# Patient Record
Sex: MALE | Race: WHITE | URBAN - METROPOLITAN AREA
[De-identification: names, ages, dates, MRNs, and addresses within clinical notes are randomized per-mention and may not be internally consistent; named-entity substitution may affect disease eponyms.]

---

## 2017-06-01 PROBLEM — G56.03 BILATERAL CARPAL TUNNEL SYNDROME: Status: ACTIVE | Noted: 2017-06-01

## 2017-06-01 PROBLEM — M25.50 ARTHRALGIA, UNSPECIFIED JOINT: Status: ACTIVE | Noted: 2017-06-01

## 2017-06-01 PROBLEM — I10 ESSENTIAL HYPERTENSION: Status: ACTIVE | Noted: 2017-06-01

## 2017-06-01 PROBLEM — I73.00 RAYNAUD'S DISEASE WITHOUT GANGRENE: Status: ACTIVE | Noted: 2017-06-01

## 2017-08-14 PROBLEM — M06.09 SERONEGATIVE RHEUMATOID ARTHRITIS OF MULTIPLE SITES (HCC): Status: ACTIVE | Noted: 2017-08-14

## 2022-05-04 ENCOUNTER — OFFICE VISIT (OUTPATIENT)
Dept: PHYSICAL MEDICINE AND REHAB | Facility: CLINIC | Age: 67
End: 2022-05-04
Payer: COMMERCIAL

## 2022-05-04 VITALS — HEIGHT: 66 IN | BODY MASS INDEX: 28.93 KG/M2 | WEIGHT: 180 LBS | HEART RATE: 70 BPM

## 2022-05-04 DIAGNOSIS — R25.2 CRAMPING OF HANDS: Primary | ICD-10-CM

## 2022-05-04 DIAGNOSIS — M25.50 ARTHRALGIA, UNSPECIFIED JOINT: ICD-10-CM

## 2022-05-04 DIAGNOSIS — G56.03 BILATERAL CARPAL TUNNEL SYNDROME: ICD-10-CM

## 2022-05-04 DIAGNOSIS — I73.00 RAYNAUD'S DISEASE WITHOUT GANGRENE: ICD-10-CM

## 2022-05-04 PROCEDURE — 3008F BODY MASS INDEX DOCD: CPT | Performed by: PHYSICAL MEDICINE & REHABILITATION

## 2022-05-04 PROCEDURE — 99204 OFFICE O/P NEW MOD 45 MIN: CPT | Performed by: PHYSICAL MEDICINE & REHABILITATION

## 2022-05-04 NOTE — PATIENT INSTRUCTIONS
Plan  I will do an EMG/NCs of the bilateral hands to better assess for an ulnar neuropathy versus a C8 radiculopathy as the cause of the hand cramping. If the EMG is non revealing, then I will look into the Raynaud's disease as the cause of his symptoms. He will follow up after having the EMG/ NCS testing.

## 2022-07-12 ENCOUNTER — PROCEDURE VISIT (OUTPATIENT)
Dept: PHYSICAL MEDICINE AND REHAB | Facility: CLINIC | Age: 67
End: 2022-07-12
Payer: COMMERCIAL

## 2022-07-12 DIAGNOSIS — G56.03 BILATERAL CARPAL TUNNEL SYNDROME: ICD-10-CM

## 2022-07-12 DIAGNOSIS — M54.12 CERVICAL RADICULOPATHY: Primary | ICD-10-CM

## 2022-07-12 DIAGNOSIS — G60.9 IDIOPATHIC PERIPHERAL NEUROPATHY: ICD-10-CM

## 2022-07-12 PROCEDURE — 95886 MUSC TEST DONE W/N TEST COMP: CPT | Performed by: PHYSICAL MEDICINE & REHABILITATION

## 2022-07-12 PROCEDURE — 95913 NRV CNDJ TEST 13/> STUDIES: CPT | Performed by: PHYSICAL MEDICINE & REHABILITATION

## 2022-07-15 PROBLEM — G60.9 IDIOPATHIC PERIPHERAL NEUROPATHY: Status: ACTIVE | Noted: 2022-07-15

## 2022-07-15 PROBLEM — M54.12 CERVICAL RADICULOPATHY: Status: ACTIVE | Noted: 2022-07-15

## 2022-09-26 ENCOUNTER — PATIENT MESSAGE (OUTPATIENT)
Dept: NEUROLOGY | Facility: CLINIC | Age: 67
End: 2022-09-26

## 2023-01-18 ENCOUNTER — PATIENT MESSAGE (OUTPATIENT)
Dept: PHYSICAL MEDICINE AND REHAB | Facility: CLINIC | Age: 68
End: 2023-01-18

## 2023-01-18 NOTE — TELEPHONE ENCOUNTER
From: Shaq Gil  To: Kelli Simpson MD  Sent: 2023 10:03 AM CST  Subject: MRI update referral    Hello Dr. Tez Simpson,  The referral for [id number] [facility] MRI (this is his wife, Valentino Cummings) has . Please renew the referral and I'll schedule the MRI to be completed before his upcoming appointment on .   Thank you.   Yamil Renner for [employer]

## 2023-01-27 ENCOUNTER — HOSPITAL ENCOUNTER (OUTPATIENT)
Dept: MRI IMAGING | Age: 68
Discharge: HOME | End: 2023-01-27
Attending: PHYSICAL MEDICINE & REHABILITATION
Payer: COMMERCIAL

## 2023-01-27 DIAGNOSIS — M54.12 CERVICAL RADICULOPATHY: ICD-10-CM

## 2023-01-27 PROCEDURE — 72141 MRI NECK SPINE W/O DYE: CPT | Performed by: PHYSICAL MEDICINE & REHABILITATION

## 2023-02-01 ENCOUNTER — HOSPITAL ENCOUNTER (OUTPATIENT)
Dept: GENERAL RADIOLOGY | Facility: HOSPITAL | Age: 68
Discharge: HOME | End: 2023-02-01
Attending: PHYSICAL MEDICINE & REHABILITATION
Payer: COMMERCIAL

## 2023-02-01 ENCOUNTER — OFFICE VISIT (OUTPATIENT)
Dept: PHYSICAL MEDICINE AND REHAB | Facility: CLINIC | Age: 68
End: 2023-02-01
Payer: COMMERCIAL

## 2023-02-01 ENCOUNTER — TELEPHONE (OUTPATIENT)
Dept: PHYSICAL MEDICINE AND REHAB | Facility: CLINIC | Age: 68
End: 2023-02-01

## 2023-02-01 VITALS
OXYGEN SATURATION: 96 % | RESPIRATION RATE: 16 BRPM | DIASTOLIC BLOOD PRESSURE: 70 MMHG | HEART RATE: 66 BPM | BODY MASS INDEX: 28.93 KG/M2 | SYSTOLIC BLOOD PRESSURE: 100 MMHG | WEIGHT: 180 LBS | HEIGHT: 66 IN

## 2023-02-01 DIAGNOSIS — I73.00 RAYNAUD'S DISEASE WITHOUT GANGRENE: ICD-10-CM

## 2023-02-01 DIAGNOSIS — M54.12 CERVICAL RADICULOPATHY: ICD-10-CM

## 2023-02-01 DIAGNOSIS — G56.03 BILATERAL CARPAL TUNNEL SYNDROME: ICD-10-CM

## 2023-02-01 DIAGNOSIS — G60.9 IDIOPATHIC PERIPHERAL NEUROPATHY: ICD-10-CM

## 2023-02-01 DIAGNOSIS — M47.812 ARTHROPATHY OF CERVICAL FACET JOINT: ICD-10-CM

## 2023-02-01 DIAGNOSIS — M06.09 SERONEGATIVE RHEUMATOID ARTHRITIS OF MULTIPLE SITES (HCC): ICD-10-CM

## 2023-02-01 DIAGNOSIS — M47.812 ARTHROPATHY OF CERVICAL FACET JOINT: Primary | ICD-10-CM

## 2023-02-01 DIAGNOSIS — M50.90 CERVICAL DISC DISEASE: ICD-10-CM

## 2023-02-01 DIAGNOSIS — R25.2 CRAMPING OF HANDS: ICD-10-CM

## 2023-02-01 DIAGNOSIS — M48.02 CERVICAL STENOSIS OF SPINE: ICD-10-CM

## 2023-02-01 PROCEDURE — 3008F BODY MASS INDEX DOCD: CPT | Performed by: PHYSICAL MEDICINE & REHABILITATION

## 2023-02-01 PROCEDURE — 99215 OFFICE O/P EST HI 40 MIN: CPT | Performed by: PHYSICAL MEDICINE & REHABILITATION

## 2023-02-01 PROCEDURE — 3078F DIAST BP <80 MM HG: CPT | Performed by: PHYSICAL MEDICINE & REHABILITATION

## 2023-02-01 PROCEDURE — 3074F SYST BP LT 130 MM HG: CPT | Performed by: PHYSICAL MEDICINE & REHABILITATION

## 2023-02-01 PROCEDURE — 72050 X-RAY EXAM NECK SPINE 4/5VWS: CPT | Performed by: PHYSICAL MEDICINE & REHABILITATION

## 2023-02-01 RX ORDER — VITAMIN B COMPLEX
1 CAPSULE ORAL DAILY
COMMUNITY

## 2023-02-01 RX ORDER — ROSUVASTATIN CALCIUM 20 MG/1
TABLET, COATED ORAL
COMMUNITY
Start: 2022-11-07

## 2023-02-01 RX ORDER — TERBINAFINE HYDROCHLORIDE 250 MG/1
TABLET ORAL
COMMUNITY
Start: 2023-01-09

## 2023-02-01 RX ORDER — TRIAMCINOLONE ACETONIDE 1 MG/G
CREAM TOPICAL
COMMUNITY
Start: 2022-12-05

## 2023-02-01 NOTE — PATIENT INSTRUCTIONS
Plan  I will do a C7-T1 ILESI. The risks of spinal cord injury and dural puncture headache were explained to the patient. If the injection helps, then he will need to have PT for the cervical spine. If the injection does not help, then I will think about doing ulnar nerve blocks at the elbows under ultrasound guidance. He will get cervical flexion and extension x-rays. He might benefit from gabapentin in the future. The patient will follow up in 2-3 months, but the patient will call me 2 weeks after having the injection to let me know how the injection worked.

## 2023-02-01 NOTE — TELEPHONE ENCOUNTER
Initiated authorization for C7-T1 ILESI CPT [id number] with [employer]  Case #[id number].   Status: Approved-authorization is not required per health plan for participating providers

## 2023-02-02 NOTE — TELEPHONE ENCOUNTER
Patient has been scheduled for a  on 2/10/23 at the [address] . Medications and allergies reviewed. Patients wife Cyrus Rouse informed patient is to hold aspirins, nsaids, blood thinners, multivitamins, phentermine, vitamin E and fish oils 3-7 days prior to procedure. Patient will drive self to and from procedure. Cyrus Rouse notified Covid testing may be required by [address]  prior to procedure and they will call w/ details. Cyrus Rouse informed patient is not to eat or drink anything after midnight the night prior to the procedure. Cyrus Rouse verbalized understanding and agrees with plan.   Cyrus Rouse requested earliest appt-[facility] notified    Cyrus Rouse reminded patient is to call/mychart with condition update 2 weeks post procedure  Follow up scheduled 5/4/23

## 2023-02-17 ENCOUNTER — OFFICE VISIT (OUTPATIENT)
Dept: SURGERY | Facility: CLINIC | Age: 68
End: 2023-02-17
Payer: COMMERCIAL

## 2023-02-17 DIAGNOSIS — M54.12 CERVICAL RADICULOPATHY: Primary | ICD-10-CM

## 2023-02-17 DIAGNOSIS — M50.90 CERVICAL DISC DISEASE: ICD-10-CM

## 2023-02-17 DIAGNOSIS — M48.02 CERVICAL STENOSIS OF SPINE: ICD-10-CM

## 2023-02-17 NOTE — PROCEDURES
[facility]    CERVICAL INTERLAMINAR  NAME:  Norris Gomez    MR #:    [id number] :  10/19/1955     PHYSICIAN:  Franco Washington MD        Operative Report    DATE OF PROCEDURE: 2023   PREOPERATIVE DIAGNOSES: 1. right subacute-chronic C6-7 & left subacute-chronic C6 radiculopathy    2. C2-3 right & central mild-mod diffuse, C3-4 mild diffuse & right mild-mod foraminal, C4-5 right mod foraminal, C5-6 mod diffuse, C6-7 mod central and left foraminal bulging discs      3. C2-3 left mild foraminal, C3-4 right mod-severe/left mod, C4-5 right severe foraminal, C5-6 mild-mod central & bilateral mod foraminal, C6-7 left mild-mod foraminal stenosis         POSTOPERATIVE DIAGNOSES:   1. right subacute-chronic C6-7 & left subacute-chronic C6 radiculopathy    2. C2-3 right & central mild-mod diffuse, C3-4 mild diffuse & right mild-mod foraminal, C4-5 right mod foraminal, C5-6 mod diffuse, C6-7 mod central and left foraminal bulging discs      3. C2-3 left mild foraminal, C3-4 right mod-severe/left mod, C4-5 right severe foraminal, C5-6 mild-mod central & bilateral mod foraminal, C6-7 left mild-mod foraminal stenosis         PROCEDURES: C7-T1 inter laminar epidural steroid injection done under fluoroscopic guidance with contrast enhancement. SURGEON: Franco Burris MD   ANESTHESIA: Local   INDICATIONS:      OPERATIVE PROCEDURE:  Written consent was obtained from the patient. The patient was brought into the operating room and placed in the prone position on the fluoroscopy table with pillow underneath the chest and shoulders. The patient's skin was cleaned and draped in a normal sterile fashion. Using AP fluoroscopy, the right C7 lamina was identified. Skin was anesthetized with 1% PF lidocaine without epinephrine. Then, a 3-1/2 inch, 20-gauge Tuohy needle was inserted and directed towards the right C7 lamina.   When it was engaged, it was walked inferiorly and medially until it was felt to drop off the inferomedial aspect. Then, Omnipaque-240 contrast was used to obtain a good epidurogram indicating correct needle placement. Then, aspiration was performed. No blood, fluid, or air was aspirated. Then, the patient was injected with a 6 cc solution of 2 cc of normal sterile saline and 2 cc of 1% PF lidocaine without epinephrine, and 2 cc of 6 mg/cc of Celestone Soluspan. Then, the needle was removed. The patient's skin was cleaned. A Band-Aid was applied. The patient was transferred to the cart and into [facility]. The patient was given discharge instructions and will follow up in the clinic as scheduled. Throughout the whole procedure, the patient's pulse oximetry and vital signs were monitored and they remained completely stable. Also, throughout the whole procedure, prior to injection of any medication, aspiration was performed. No blood, fluid, or air was aspirated at anytime.

## 2023-03-05 ENCOUNTER — PATIENT MESSAGE (OUTPATIENT)
Dept: PHYSICAL MEDICINE AND REHAB | Facility: CLINIC | Age: 68
End: 2023-03-05

## 2023-03-05 DIAGNOSIS — R25.2 CRAMPING OF HANDS: Primary | ICD-10-CM

## 2023-03-05 DIAGNOSIS — M47.812 ARTHROPATHY OF CERVICAL FACET JOINT: ICD-10-CM

## 2023-03-05 DIAGNOSIS — R25.2 SPASMS OF THE HANDS OR FEET: ICD-10-CM

## 2023-03-08 NOTE — TELEPHONE ENCOUNTER
Condition update after Procedure. - Patient had C7-T1 inter laminar epidural steroid injection on 2/17/23 with Vanessa Oleary. \"Initially after the injection there was relief - 0%. The last week or so. it has been somewhat better - 10-20% relief. \"    - LOV: 2/1/2023 Micah Ashley MD    - NOV: 5/4/2023 Micah Ashley MD   - Plan from [address] [place] [address]: \"I will do a C7-T1 ILESI. The risks of spinal cord injury and dural puncture headache were explained to the patient. If the injection helps, then he will need to have PT for the cervical spine. If the injection does not help, then I will think about doing ulnar nerve blocks at the elbows under ultrasound guidance. He will get cervical flexion and extension x-rays. He might benefit from gabapentin in the future. The patient will follow up in 2-3 months, but the patient will call me 2 weeks after having the injection to let me know how the injection worked. \"    Patient update forwarded on to Vanessa Oleary to advise on next steps.

## 2023-03-21 NOTE — TELEPHONE ENCOUNTER
Please contact the patient and if he is not better, then place the order for bilateral ulnar nerve sheath injections at the elbows, but I will need to see him the office first to make sure that this is what I want to do.

## 2023-03-29 NOTE — TELEPHONE ENCOUNTER
Spoke with patient and reviewed message below. Patient stated he still has not had any significant relief from the neck injection. Offered to go over sooner appointments, but patient would like to keep his 5/4/23 appointment as scheduled. Order for: bilateral ulnar nerve sheath injections at the elbows has been placed. Note added to upcoming appointment.

## 2023-03-30 ENCOUNTER — TELEPHONE (OUTPATIENT)
Dept: PHYSICAL MEDICINE AND REHAB | Facility: CLINIC | Age: 68
End: 2023-03-30

## 2023-03-30 NOTE — TELEPHONE ENCOUNTER
Initiated authorization for bilateral ulnar nerve sheath injections at the elbows under ultrasound guidance CPT 20550x2, , 77023 with [employer]  Status: Approved-authorization is not required per health plan        Patient already Scheduled 5/4/23

## 2023-05-04 ENCOUNTER — OFFICE VISIT (OUTPATIENT)
Dept: PHYSICAL MEDICINE AND REHAB | Facility: CLINIC | Age: 68
End: 2023-05-04
Payer: COMMERCIAL

## 2023-05-04 VITALS
OXYGEN SATURATION: 98 % | DIASTOLIC BLOOD PRESSURE: 64 MMHG | SYSTOLIC BLOOD PRESSURE: 110 MMHG | BODY MASS INDEX: 28.93 KG/M2 | HEIGHT: 66 IN | HEART RATE: 73 BPM | WEIGHT: 180 LBS

## 2023-05-04 DIAGNOSIS — M50.90 CERVICAL DISC DISEASE: ICD-10-CM

## 2023-05-04 DIAGNOSIS — M06.09 SERONEGATIVE RHEUMATOID ARTHRITIS OF MULTIPLE SITES (HCC): ICD-10-CM

## 2023-05-04 DIAGNOSIS — M47.812 ARTHROPATHY OF CERVICAL FACET JOINT: ICD-10-CM

## 2023-05-04 DIAGNOSIS — M48.02 CERVICAL STENOSIS OF SPINE: ICD-10-CM

## 2023-05-04 DIAGNOSIS — G56.03 BILATERAL CARPAL TUNNEL SYNDROME: ICD-10-CM

## 2023-05-04 DIAGNOSIS — G60.9 IDIOPATHIC PERIPHERAL NEUROPATHY: ICD-10-CM

## 2023-05-04 DIAGNOSIS — R25.2 CRAMPING OF HANDS: Primary | ICD-10-CM

## 2023-05-04 DIAGNOSIS — M54.12 CERVICAL RADICULOPATHY: ICD-10-CM

## 2023-05-04 DIAGNOSIS — G56.23 ULNAR NEUROPATHY OF BOTH UPPER EXTREMITIES: ICD-10-CM

## 2023-05-04 PROCEDURE — 99214 OFFICE O/P EST MOD 30 MIN: CPT | Performed by: PHYSICAL MEDICINE & REHABILITATION

## 2023-05-04 PROCEDURE — 3078F DIAST BP <80 MM HG: CPT | Performed by: PHYSICAL MEDICINE & REHABILITATION

## 2023-05-04 PROCEDURE — 3008F BODY MASS INDEX DOCD: CPT | Performed by: PHYSICAL MEDICINE & REHABILITATION

## 2023-05-04 PROCEDURE — 3074F SYST BP LT 130 MM HG: CPT | Performed by: PHYSICAL MEDICINE & REHABILITATION

## 2023-05-08 ENCOUNTER — TELEPHONE (OUTPATIENT)
Dept: PHYSICAL MEDICINE AND REHAB | Facility: CLINIC | Age: 68
End: 2023-05-08

## 2023-05-08 NOTE — TELEPHONE ENCOUNTER
Initiated authorization for ultrasound of bilateral ulnar nerves CPT 80676 with [employer]  Status: Approved-authorization is not required per health plan          Patient offered appt this week and next however declined due to other appt.  Scheduled 5/24/23 at 3:30p    Patient and wife states Dr. Rosa Nelson clinical staff informed him to get US done at [address] [place] [address] so they scheduled appt 6/5/23 for the [address] [facility]   routing to clinical staff for clarity as Dr. Rosa Nelson performs US of bilateral Ulnar Nerves in office

## 2023-05-24 ENCOUNTER — OFFICE VISIT (OUTPATIENT)
Dept: PHYSICAL MEDICINE AND REHAB | Facility: CLINIC | Age: 68
End: 2023-05-24
Payer: COMMERCIAL

## 2023-05-24 DIAGNOSIS — G56.23 ULNAR NEUROPATHY OF BOTH UPPER EXTREMITIES: Primary | ICD-10-CM

## 2023-05-24 PROCEDURE — 76881 US COMPL JOINT R-T W/IMG: CPT | Performed by: PHYSICAL MEDICINE & REHABILITATION

## 2023-05-24 NOTE — PROCEDURES
Diagnostic ultrasound examination of the bilateral ulnar nerves at the level of the elbows:  Right ulnar nerve proximal to the elbow cross sectional area:  8 mm2  Right ulnar nerve at the elbow cross sectional area:  12 mm2   Right ulnar nerve distal to the elbow cross sectional area:  6 mm2   Left ulnar nerve proximal to the elbow cross sectional area:  8 mm2  Left ulnar nerve at the elbow cross sectional area:  13 mm2  Left ulnar nerve distal to the elbow cross sectional area:  7.5 mm2    There were muscle abnormalities noted in either arm or forearm and medial epicondyles were normal bilaterally. Impression:  Left > right ulnar nerve swelling at the level of the elbows which is consistent with ulnar neuropathies at these levels.

## 2023-05-30 ENCOUNTER — PATIENT MESSAGE (OUTPATIENT)
Dept: PHYSICAL MEDICINE AND REHAB | Facility: CLINIC | Age: 68
End: 2023-05-30

## 2023-06-19 ENCOUNTER — MED REC SCAN ONLY (OUTPATIENT)
Dept: PHYSICAL MEDICINE AND REHAB | Facility: CLINIC | Age: 68
End: 2023-06-19

## 2023-06-19 NOTE — TELEPHONE ENCOUNTER
PT order faxed to [facility] fax #: [phone] per patient request.    Nothing further needed at this time.

## (undated) NOTE — LETTER
5/4/2023      Yennifer Nelson MD  Physical Medicine and Rehabilitation  [address] [facility], [address] [place] [address]  Dept: [phone]  Dept Fax: [phone]        RE: Consultation for Iram Ivan        Dear Jalil Luna,    Thank you very much for the opportunity to see your patient. Attached please find a summary from your patient's recent visit. I appreciate the chance to take care of your patient with you. Please feel free to call me with any questions or concerns. Sincerely,        Julia Murrell.  Rosa Nelson MD  Electronically Signed on 5/4/2023

## (undated) NOTE — LETTER
Date: May 24, 2023      Patient Name: Krystal Flowers      : 10/19/1955        Thank you for choosing  [facility] as your health care provider. Your physician has deemed the following medical service(s) necessary. However, your insurance plan may not pay for all of your health care and costs and may deny payment for this service. The fact that your insurance plan does not pay for an item or service does not mean you should not receive it. The purpose of this form is to help you make an informed decision about whether or not you want to receive this service(s) that may not be paid for by your insurance plan. CPT Code Description     Cost     ultrasound of bilateral ulnar nerves    I understand that the above mentioned service(s) or supply may not be covered by my insurance company.  I agree to be financially responsible for the cost of this service or supply in the event of my insurance denies payment as a non-covered benefit.        ______________________________________________________________________  Signature of Patient or Patient's Representative  Relationship  Date    ______________________________________________________________________  Signature of Witness to signing of form   Printed Name

## (undated) NOTE — LETTER
Date: May 4, 2023      Patient Name: Baldemar Hill      : 10/19/1955        Thank you for choosing [facility] [facility] as your health care provider. Your physician has deemed the following medical service(s) necessary. However, your insurance plan may not pay for all of your health care and costs and may deny payment for this service. The fact that your insurance plan does not pay for an item or service does not mean you should not receive it. The purpose of this form is to help you make an informed decision about whether or not you want to receive this service(s) that may not be paid for by your insurance plan. CPT Code Description     Cost     _________ bilateral ulnar nerve sheath injections at the elbows under ultrasound guidance      _________ ______________________________ _____________      _________ ______________________________ _____________      I understand that the above mentioned service(s) or supply may not be covered by my insurance company.  I agree to be financially responsible for the cost of this service or supply in the event of my insurance denies payment as a non-covered benefit.        ______________________________________________________________________  Signature of Patient or Patient's Representative  Relationship  Date    ______________________________________________________________________  Signature of Witness to signing of form   Printed Name

## (undated) NOTE — LETTER
AUTHORIZATION FOR SURGICAL OPERATION OR OTHER PROCEDURE    1. I hereby authorize Dr. Joby Mcqueen and the [facility] staff assigned to my case to perform the following operation and/or procedure at the [facility]:    bilateral ulnar nerve sheath injections at the elbows under ultrasound guidance    2. My physician has explained the nature and purpose of the operation or other procedure, possible alternative methods of treatment, the risks involved, and the possibility of complication to me. I acknowledge that no guarantee has been made as to the result that may be obtained. 3.  I recognize that, during the course of this operation, or other procedure, unforseen conditions may necessitate additional or different procedure than those listed above. I, therefore, further authorize and request that the above named physician, his/her physician assistants or designees perform such procedures as are, in his/her professional opinion, necessary and desirable. 4.  Any tissue or organs removed in the operation or other procedure may be disposed of by and at the discretion of the [facility] staff and [facility]. 5.  I understand that in the event of a medical emergency, I will be transported by local paramedics to [facility] or other [facility] emergency department. 6.  I certify that I have read and fully understand the above consent to operation and/or other procedure. 7.  I acknowledge that my physician has explained sedation/analgesia administration to me including the risks and benefits. I consent to the administration of sedation/analgesia as may be necessary or desirable in the judgement of my physician. Witness signature: ___________________________________________________ Date:  ______/______/_____                    Time:  ________ A. M.  P.M.        Patient Name:  Akhil Clark  [birth year]  [id number]         Patient signature: ___________________________________________________               Statement of Physician  My signature below affirms that prior to the time of the procedure, I have explained to the patient and/or his/her guardian, the risks and benefits involved in the proposed treatment and any reasonable alternative to the proposed treatment. I have also explained the risks and benefits involved in the refusal of the proposed treatment and have answered the patient's questions.                         Date:  ______/______/_______  Provider                      Signature:  __________________________________________________________       Time:  ___________ AHENNY MATAMOROS

## (undated) NOTE — LETTER
[facility], [address], [place]  [address] Dora Rizo [phone]             [address] ORDER         Patient Name:   Shaq Gil, ([id number])   Sex: male  : 10/19/1955       Order Date:  2023  Authorizing Provider:   Fritz Chairez     Procedure:  PHYSICAL THERAPY - INTERNAL [[id number]]         Order #:   [id number]  Qty:  1     Priority:  Routine                   Class:   EHV - RFL     Standing Interval:          Standing Occurrences:          Expires on:            Expected by:    Associated DX:  Ulnar neuropathy of both upper extremities (G56.23)     Order summary:  EHV - RFL, Routine, Referral By Gillian ROQUE 1 visit  Physical Therapy Area of Concentration: Ortho  Physical Therapy Ortho: General         Comments:  Ulnar neuropathy of both upper extremities  (primary encounter diagnosis)     1-2 times /week for 4 weeks. Improve left triceps flexibility with neural flossing and mobilization of the ulnar nerve. Improve scapular and shoulder function to help with the kinetic chain of the bilateral upper extremities and the ulnar nerve function. HEP/MFR           Scheduling Instructions:  Note to Managed Care Patients: This is the physician order form only and not an authorization for services. Your physician has recommended you to have physical therapy done at [facility] however, your insurance company may require you to have these services done at another facility or to obtain an approved referral. Services ordered by your physician may not be covered unless prior authorization is obtained in accordance with your insurance company's guidelines. Unauthorized care may be your financial responsibility. If you have questions, please call your plans customer service number located on your ID card. To schedule Physical Therapy at any of the [facility] facilities, please call   [phone].      [facility] Services in [facility] OF [facility]  [phone] [facility] in [address] [facility], [address] [place] [address] Service in [address] [place] [address], [facility] [address] [address] [facility] in [address] [facility], [facility] in [facility]          Electronically Signed By: Jarvis Meckel, MD    Order Date: May 24, 2023 at 6:41 PM

## (undated) NOTE — LETTER
AUTHORIZATION FOR SURGICAL OPERATION OR OTHER PROCEDURE    1. I hereby authorize Dr. Scarlett Escudero and the [facility] staff assigned to my case to perform the following operation and/or procedure at the [facility]:    ultrasound of bilateral ulnar nerves    2. My physician has explained the nature and purpose of the operation or other procedure, possible alternative methods of treatment, the risks involved, and the possibility of complication to me. I acknowledge that no guarantee has been made as to the result that may be obtained. 3.  I recognize that, during the course of this operation, or other procedure, unforseen conditions may necessitate additional or different procedure than those listed above. I, therefore, further authorize and request that the above named physician, his/her physician assistants or designees perform such procedures as are, in his/her professional opinion, necessary and desirable. 4.  Any tissue or organs removed in the operation or other procedure may be disposed of by and at the discretion of the [facility] staff and [facility]. 5.  I understand that in the event of a medical emergency, I will be transported by local paramedics to [facility] or other hospital emergency department. 6.  I certify that I have read and fully understand the above consent to operation and/or other procedure. 7.  I acknowledge that my physician has explained sedation/analgesia administration to me including the risks and benefits. I consent to the administration of sedation/analgesia as may be necessary or desirable in the judgement of my physician. Witness signature: ___________________________________________________ Date:  ______/______/_____                    Time:  ________ A. M.  P.M.    Corine Hudsonbryan  10/19/1955  [id number]      Patient signature:  ___________________________________________________               Statement of Physician  My signature below affirms that prior to the time of the procedure, I have explained to the patient and/or his/her guardian, the risks and benefits involved in the proposed treatment and any reasonable alternative to the proposed treatment. I have also explained the risks and benefits involved in the refusal of the proposed treatment and have answered the patient's questions.                         Date:  ______/______/_______  Provider                      Signature:  __________________________________________________________       Time:  ___________ ARoxiM    P.M.

## (undated) NOTE — LETTER
7/15/2022      Oscar Estrella MD  Physical Medicine and Rehabilitation  [address] [facility], [address] [place] [address]  Dept: [phone]  Dept Fax: [phone]        RE: Consultation for Janelle De        Dear Celena Christensen,    Thank you very much for the opportunity to see your patient. Attached please find a summary from your patient's recent visit. I appreciate the chance to take care of your patient with you. Please feel free to call me with any questions or concerns. Sincerely,        Ariella Jacobsen.  Trenton Estrella MD  Electronically Signed on 7/15/2022

## (undated) NOTE — LETTER
5/24/2023      [place] Maggy Hopson MD  Physical Medicine and Rehabilitation  [address] [facility], [address] [place] [address]  Dept: [phone]  Dept Fax: [phone]        RE: Consultation for Airam Cuenca        Dear Josette Gonzalez,    Thank you very much for the opportunity to see your patient. Attached please find a summary from your patient's recent visit. I appreciate the chance to take care of your patient with you. Please feel free to call me with any questions or concerns. Sincerely,        Cinthya Guillen.  Juve Hopson MD  Electronically Signed on 5/24/2023

## (undated) NOTE — LETTER
2/17/2023      Malinda Claude A. Bunnie Pac, MD  Physical Medicine and Rehabilitation  [address] [facility], [address] [place] [address]  Dept: [phone]  Dept Fax: [phone]        RE: Consultation for Baylee Seaman        Dear Cruz Motta,    Thank you very much for the opportunity to see your patient. Attached please find a summary from your patient's recent visit. I appreciate the chance to take care of your patient with you. Please feel free to call me with any questions or concerns. Sincerely,        Stacey Laws MD  Electronically Signed on 2/17/2023

## (undated) NOTE — LETTER
2/1/2023      Ulysses Javier MD  Physical Medicine and Rehabilitation  [address] [place] [address]  Dept: [phone]  Dept Fax: [phone]        RE: Consultation for Rukhsana Johnson        Dear Duc Syed,    Thank you very much for the opportunity to see your patient. Attached please find a summary from your patient's recent visit. I appreciate the chance to take care of your patient with you. Please feel free to call me with any questions or concerns. Sincerely,        Lacey Ruiz.  Mayco Javier MD  Electronically Signed on 2/1/2023